# Patient Record
Sex: FEMALE | Race: WHITE | Employment: UNEMPLOYED | URBAN - NONMETROPOLITAN AREA
[De-identification: names, ages, dates, MRNs, and addresses within clinical notes are randomized per-mention and may not be internally consistent; named-entity substitution may affect disease eponyms.]

---

## 2021-09-06 ENCOUNTER — HOSPITAL ENCOUNTER (EMERGENCY)
Age: 26
Discharge: HOME | End: 2021-09-06
Attending: EMERGENCY MEDICINE
Payer: MEDICARE

## 2021-09-06 VITALS
HEIGHT: 61 IN | HEART RATE: 77 BPM | DIASTOLIC BLOOD PRESSURE: 67 MMHG | RESPIRATION RATE: 14 BRPM | OXYGEN SATURATION: 100 % | SYSTOLIC BLOOD PRESSURE: 118 MMHG | WEIGHT: 155 LBS | TEMPERATURE: 98.4 F | BODY MASS INDEX: 29.27 KG/M2

## 2021-09-06 VITALS — SYSTOLIC BLOOD PRESSURE: 111 MMHG | OXYGEN SATURATION: 98 % | HEART RATE: 70 BPM | DIASTOLIC BLOOD PRESSURE: 80 MMHG

## 2021-09-06 DIAGNOSIS — O03.4 INEVITABLE ABORTION: Primary | ICD-10-CM

## 2021-09-06 LAB
ABO/RH: NORMAL
ABSOLUTE EOS #: 0.04 K/UL (ref 0–0.44)
ABSOLUTE IMMATURE GRANULOCYTE: <0.03 K/UL (ref 0–0.3)
ABSOLUTE LYMPH #: 1.17 K/UL (ref 1.1–3.7)
ABSOLUTE MONO #: 0.35 K/UL (ref 0.1–1.2)
BASOPHILS # BLD: 1 % (ref 0–2)
BASOPHILS ABSOLUTE: <0.03 K/UL (ref 0–0.2)
DIFFERENTIAL TYPE: NORMAL
EOSINOPHILS RELATIVE PERCENT: 1 % (ref 1–4)
HCG QUANTITATIVE: 74 IU/L
HCT VFR BLD CALC: 40.3 % (ref 36.3–47.1)
HEMOGLOBIN: 13.2 G/DL (ref 11.9–15.1)
IMMATURE GRANULOCYTES: 0 %
LYMPHOCYTES # BLD: 31 % (ref 24–43)
MCH RBC QN AUTO: 29.7 PG (ref 25.2–33.5)
MCHC RBC AUTO-ENTMCNC: 32.8 G/DL (ref 25.2–33.5)
MCV RBC AUTO: 90.8 FL (ref 82.6–102.9)
MONOCYTES # BLD: 9 % (ref 3–12)
NRBC AUTOMATED: 0 PER 100 WBC
PDW BLD-RTO: 13.1 % (ref 11.8–14.4)
PLATELET # BLD: 270 K/UL (ref 138–453)
PLATELET ESTIMATE: NORMAL
PMV BLD AUTO: 9.6 FL (ref 8.1–13.5)
RBC # BLD: 4.44 M/UL (ref 3.95–5.11)
RBC # BLD: NORMAL 10*6/UL
SEG NEUTROPHILS: 58 % (ref 36–65)
SEGMENTED NEUTROPHILS ABSOLUTE COUNT: 2.14 K/UL (ref 1.5–8.1)
WBC # BLD: 3.7 K/UL (ref 3.5–11.3)
WBC # BLD: NORMAL 10*3/UL

## 2021-09-06 PROCEDURE — 36415 COLL VENOUS BLD VENIPUNCTURE: CPT

## 2021-09-06 PROCEDURE — 84702 CHORIONIC GONADOTROPIN TEST: CPT

## 2021-09-06 PROCEDURE — 99285 EMERGENCY DEPT VISIT HI MDM: CPT

## 2021-09-06 PROCEDURE — 86900 BLOOD TYPING SEROLOGIC ABO: CPT

## 2021-09-06 PROCEDURE — 86901 BLOOD TYPING SEROLOGIC RH(D): CPT

## 2021-09-06 PROCEDURE — 85025 COMPLETE CBC W/AUTO DIFF WBC: CPT

## 2021-09-06 NOTE — ED PROVIDER NOTES
[address]      Pt Name: Denise Love March  MRN: [id number]  [place] 1995  Date of evaluation: 9/6/2021      CHIEF COMPLAINT       Chief Complaint   Patient presents with    Vaginal Bleeding     about 6 weeks pregant          HISTORY OF PRESENT ILLNESS      The patient presents with vaginal bleeding. She says she has had vaginal bleeding throughout her entire pregnancy. Her quantitative hCG has been going up it was 289. Her estimated gestational age is 7 weeks. She says her doctor has not done an ultrasound yet because she has not yet in the discriminatory zone. She says her bleeding has gotten a little bit more heavy. It was just spotting. She is G5, P3 Ab1 having had 1 miscarriage. The patient denies fever. She has had a placenta previa but no other significant problems with her other pregnancies. She denies dizziness. She does not know her blood type. REVIEW OF SYSTEMS       All systems reviewed and negative unless noted in HPI. The patient denies fever or constitutional symptoms. Denies vision change. Denies any sore throat or rhinorrhea. Denies any neck pain or stiffness. Denies chest pain or shortness of breath. No nausea,  vomiting or diarrhea. Denies any dysuria. Denies urinary frequency or hematuria. Vaginal bleeding as noted in HPI. Denies musculoskeletal injury or pain. Denies any weakness, numbness or focal neurologic deficit. Denies any skin rash or edema. No recent psychiatric issues. No easy bruising or bleeding. Denies any polyuria, polydypsia or history of immunocompromise. PAST MEDICAL HISTORY    has no past medical history on file. SURGICAL HISTORY      has no past surgical history on file. CURRENT MEDICATIONS       Previous Medications    No medications on file       ALLERGIES     is allergic to tramadol. FAMILY HISTORY     has no family status information on file.       family history is not on file. SOCIAL HISTORY      reports that she has never smoked. She has never used smokeless tobacco.    PHYSICAL EXAM     INITIAL VITALS:  height is 5' 1\" (1.549 m) and weight is 155 lb (70.3 kg). Her tympanic temperature is 98.4 °F (36.9 °C). Her blood pressure is 118/67 and her pulse is 77. Her respiration is 14 and oxygen saturation is 100%. The patient is alert and oriented, in no apparent distress. HEENT is atraumatic. Pupils are PERRL at 4 mm. Mucous membranes moist.    Neck is supple. Heart sounds regular rate and rhythm with no gallops, murmurs, or rubs. Lungs clear, no wheezes, rales or rhonchi. Abdomen: soft, nontender with no pain to palpation. No pulsatile mass. Normal bowel sounds are noted. No rebound or guarding. Pelvic: Dark blood in vaginal vault. Cervical os closed. No adnexal tenderness or fullness. Musculoskeletal exam shows no evidence of trauma. Normal distal pulses in all extremities. Skin: no rash or edema. Neurological exam reveals cranial nerves 2 through 12 grossly intact. Patient has equal  and normal deep tendon reflexes. Psychiatric: Appropriate. Lymphatics.:  No lymphadenopathy.          DIFFERENTIAL DIAGNOSIS/ MDM:     Threatened AB, ectopic pregnancy    DIAGNOSTIC RESULTS       LABS:       CBC Auto Differential (Final result)   Component (Lab Inquiry)  Collection Time Result Time WBC RBC HGB HCT MCV   09/06/21 08:07:00 09/06/21 08:15:23 3.7 4.44 13.2 40.3 90.8       Collection Time Result Time MCH MCHC RDW PLT MPV   09/06/21 08:07:00 09/06/21 08:15:23 29.7 32.8 13.1 270 9.6       Collection Time Result Time NRBC Auto DIFF TYPE Seg Neutrophils LYMPHO Monocytes   09/06/21 08:07:00 09/06/21 08:15:23 0.0 NOT REPORTED 58 31 9       Collection Time Result Time EOS BASO Immature Granulocytes SEGS ABS Absolute Lymph #   09/06/21 08:07:00 09/06/21 08:15:23 1 1 0 2.14 1.17       Collection Time Result Time Absolute Mono # Absolute Eos # BASOS ABS absimmgran WBC Morphology   09/06/21 08:07:00 09/06/21 08:15:23 0.35 0.04 <0.03 <0.03 NOT REPORTED       Collection Time Result Time RBC Morphology Platelet Estimate   38/38/78 08:07:00 09/06/21 08:15:23 NOT REPORTED NOT REPORTED         Final result                          Contains abnormal data HCG, Quantitative, Pregnancy (Final result)   Component (Lab Inquiry)  Collection Time Result Time hCG Quant   09/06/21 08:07:00 09/06/21 08:58:54 74     Non-preg premeno . Elina Money Elina Money High        Final result                        ABO/RH (Final result)   Component (Lab Inquiry)  Collection Time Result Time ABO/Rh   09/06/21 08:07:00 09/06/21 08:28:37 A POSITIVE         Final result               EMERGENCY DEPARTMENT COURSE:   Vitals:    Vitals:    09/06/21 0755 09/06/21 0800   BP:  118/67   Pulse: 77    Resp: 14    Temp: 98.4 °F (36.9 °C)    TempSrc: Tympanic    SpO2: 100%    Weight: 155 lb (70.3 kg)    Height: 5' 1\" (1.549 m)      -------------------------  BP: 118/67, Temp: 98.4 °F (36.9 °C), Pulse: 77, Resp: 14      Re-evaluation Notes    The patient appears to be having a miscarriage. I explained that she will need to follow-up to make sure that it completes. She can expect increased bleeding. She should return for worsening bleeding, dizziness, fever, or if worse in any way. She knows to follow-up with her doctor. FINAL IMPRESSION      Inevitable AB    DISPOSITION/PLAN   DISPOSITION        Condition on Disposition    stable    PATIENT REFERRED TO:  No follow-up provider specified.     DISCHARGE MEDICATIONS:  New Prescriptions    No medications on file       (Please note that portions of this note were completed with a voice recognition program.  Efforts were made to edit the dictations but occasionally words are mis-transcribed.)    Vitor Tai MD,, MD   Attending Emergency Physician           Tarik Lira MD  09/06/21 [id number]

## 2022-10-10 ENCOUNTER — APPOINTMENT (OUTPATIENT)
Dept: ULTRASOUND IMAGING | Age: 27
End: 2022-10-10
Payer: MEDICARE

## 2022-10-10 ENCOUNTER — HOSPITAL ENCOUNTER (EMERGENCY)
Age: 27
Discharge: HOME | End: 2022-10-11
Attending: SPECIALIST
Payer: MEDICARE

## 2022-10-10 DIAGNOSIS — Z34.90 INTRAUTERINE PREGNANCY: ICD-10-CM

## 2022-10-10 DIAGNOSIS — R10.31 RIGHT LOWER QUADRANT PAIN: ICD-10-CM

## 2022-10-10 DIAGNOSIS — R10.31 RIGHT LOWER QUADRANT ABDOMINAL PAIN: Primary | ICD-10-CM

## 2022-10-10 LAB
ABSOLUTE EOS #: 0.16 K/UL (ref 0–0.44)
ABSOLUTE IMMATURE GRANULOCYTE: 0.04 K/UL (ref 0–0.3)
ABSOLUTE LYMPH #: 1.99 K/UL (ref 1.1–3.7)
ABSOLUTE MONO #: 0.5 K/UL (ref 0.1–1.2)
ALBUMIN SERPL-MCNC: 3.7 G/DL (ref 3.5–5.2)
ALBUMIN/GLOBULIN RATIO: 1.4 (ref 1–2.5)
ALP BLD-CCNC: 52 U/L (ref 35–104)
ALT SERPL-CCNC: 32 U/L (ref 5–33)
ANION GAP SERPL CALCULATED.3IONS-SCNC: 10 MMOL/L (ref 9–17)
AST SERPL-CCNC: 17 U/L
BACTERIA: ABNORMAL
BASOPHILS # BLD: 0 % (ref 0–2)
BASOPHILS ABSOLUTE: 0.03 K/UL (ref 0–0.2)
BILIRUB SERPL-MCNC: 0.1 MG/DL (ref 0.3–1.2)
BILIRUBIN URINE: NEGATIVE
BUN BLDV-MCNC: 8 MG/DL (ref 6–20)
BUN/CREAT BLD: ABNORMAL (ref 9–20)
CALCIUM SERPL-MCNC: 8.6 MG/DL (ref 8.6–10.4)
CHLORIDE BLD-SCNC: 104 MMOL/L (ref 98–107)
CO2: 24 MMOL/L (ref 20–31)
CREAT SERPL-MCNC: <0.4 MG/DL (ref 0.5–0.9)
EOSINOPHILS RELATIVE PERCENT: 2 % (ref 1–4)
EPITHELIAL CELLS UA: ABNORMAL /HPF (ref 0–5)
GFR SERPL CREATININE-BSD FRML MDRD: ABNORMAL ML/MIN/1.73M2
GLUCOSE BLD-MCNC: 89 MG/DL (ref 70–99)
GLUCOSE URINE: NEGATIVE
HCT VFR BLD CALC: 35.2 % (ref 36.3–47.1)
HEMOGLOBIN: 12.4 G/DL (ref 11.9–15.1)
IMMATURE GRANULOCYTES: 0 %
KETONES, URINE: NEGATIVE
LEUKOCYTE ESTERASE, URINE: NEGATIVE
LYMPHOCYTES # BLD: 21 % (ref 24–43)
MCH RBC QN AUTO: 31.2 PG (ref 25.2–33.5)
MCHC RBC AUTO-ENTMCNC: 35.2 G/DL (ref 25.2–33.5)
MCV RBC AUTO: 88.4 FL (ref 82.6–102.9)
MONOCYTES # BLD: 5 % (ref 3–12)
MUCUS: ABNORMAL
NITRITE, URINE: NEGATIVE
NRBC AUTOMATED: 0 PER 100 WBC
PDW BLD-RTO: 12.7 % (ref 11.8–14.4)
PH UA: 6 (ref 5–6)
PLATELET # BLD: 261 K/UL (ref 138–453)
PMV BLD AUTO: 9.6 FL (ref 8.1–13.5)
POTASSIUM SERPL-SCNC: 3.8 MMOL/L (ref 3.7–5.3)
PROTEIN UA: NEGATIVE
RBC # BLD: 3.98 M/UL (ref 3.95–5.11)
RBC UA: ABNORMAL /HPF (ref 0–4)
SEG NEUTROPHILS: 72 % (ref 36–65)
SEGMENTED NEUTROPHILS ABSOLUTE COUNT: 6.68 K/UL (ref 1.5–8.1)
SODIUM BLD-SCNC: 138 MMOL/L (ref 135–144)
SPECIFIC GRAVITY UA: 1.03 (ref 1.01–1.02)
TOTAL PROTEIN: 6.3 G/DL (ref 6.4–8.3)
URINE HGB: NEGATIVE
UROBILINOGEN, URINE: NORMAL
WBC # BLD: 9.4 K/UL (ref 3.5–11.3)
WBC UA: ABNORMAL /HPF (ref 0–4)

## 2022-10-10 PROCEDURE — 76700 US EXAM ABDOM COMPLETE: CPT

## 2022-10-10 PROCEDURE — 93975 VASCULAR STUDY: CPT

## 2022-10-10 PROCEDURE — 76805 OB US >/= 14 WKS SNGL FETUS: CPT

## 2022-10-10 PROCEDURE — 76705 ECHO EXAM OF ABDOMEN: CPT

## 2022-10-10 PROCEDURE — 99283 EMERGENCY DEPT VISIT LOW MDM: CPT

## 2022-10-10 PROCEDURE — 81001 URINALYSIS AUTO W/SCOPE: CPT

## 2022-10-10 PROCEDURE — 76856 US EXAM PELVIC COMPLETE: CPT

## 2022-10-10 PROCEDURE — 80053 COMPREHEN METABOLIC PANEL: CPT

## 2022-10-10 PROCEDURE — 85025 COMPLETE CBC W/AUTO DIFF WBC: CPT

## 2022-10-10 PROCEDURE — 36415 COLL VENOUS BLD VENIPUNCTURE: CPT

## 2022-10-10 RX ORDER — 0.9 % SODIUM CHLORIDE 0.9 %
500 INTRAVENOUS SOLUTION INTRAVENOUS ONCE
Status: DISCONTINUED | OUTPATIENT
Start: 2022-10-10 | End: 2022-10-11 | Stop reason: HOSPADM

## 2022-10-10 ASSESSMENT — PAIN SCALES - GENERAL: PAINLEVEL_OUTOF10: 5

## 2022-10-10 ASSESSMENT — PAIN - FUNCTIONAL ASSESSMENT: PAIN_FUNCTIONAL_ASSESSMENT: 0-10

## 2022-10-11 VITALS
RESPIRATION RATE: 16 BRPM | OXYGEN SATURATION: 99 % | DIASTOLIC BLOOD PRESSURE: 59 MMHG | TEMPERATURE: 99 F | BODY MASS INDEX: 33.38 KG/M2 | WEIGHT: 170 LBS | HEIGHT: 60 IN | HEART RATE: 78 BPM | SYSTOLIC BLOOD PRESSURE: 108 MMHG

## 2022-10-11 ASSESSMENT — ENCOUNTER SYMPTOMS
SHORTNESS OF BREATH: 0
ABDOMINAL PAIN: 1
NAUSEA: 0
SORE THROAT: 0
VOMITING: 0
COUGH: 0

## 2022-10-11 NOTE — DISCHARGE INSTRUCTIONS
PLEASE RETURN TO THE EMERGENCY DEPARTMENT IMMEDIATELY if your symptoms worsen in anyway or in 8-12 hours if not improved for re-evaluation. You should immediately return to the ER for symptoms such as increasing pain, bloody stool, fever, a feeling of passing out, light headed, dizziness, chest pain, shortness of breath, persistent nausea and/or vomiting, numbness or weakness to the arms or legs, coolness or color change of the arms or legs. Take your medication as indicated and prescribed. If you are given an antibiotic then, make sure you get the prescription filled and take the antibiotics until finished. Please understand that at this time there is no evidence for a more serious underlying process, but that early in the process of an illness or injury, an emergency department workup can be falsely reassuring. You should contact your family doctor within the next 24 hours for a follow up appointment    Jelena Oliveira!!!    From [facility] ([facility]) and [address] [place] [address] Emergency Services    On behalf of the Emergency Department staff at [facility]), I would like to thank you for giving us the opportunity to address your health care needs and concerns. We hope that during your visit, our service was delivered in a professional and caring manner. Please keep [facility]) in mind as we walk with you down the path to your own personal wellness. Please expect an automated text message or email from us so we can ask a few questions about your health and progress. Based on your answers, a clinician may call you back to offer help and instructions. Please understand that early in the process of an illness or injury, an emergency department workup can be falsely reassuring. If you notice any worsening, changing or persistent symptoms please call your family doctor or return to the ER immediately. Tell us how we did during your visit at [email or web address]   and let us know about your experience

## 2022-10-11 NOTE — ED PROVIDER NOTES
[address]      Pt Name: Celeste Estrella  MRN: [id number]  [place] 1995  Date of evaluation: 10/10/2022      CHIEF COMPLAINT       Chief Complaint   Patient presents with    Abdominal Pain     16 weeks pregnant, RLQ pain         HISTORY OF PRESENT ILLNESS    Celeste Estrella is a 28 y.o. female who presents to the emergency department accompanied by her family for evaluation of right lower quadrant abdominal pain since 2 days prior to arrival.  Patient is G6, P3 AB 2 about 16 weeks and 2 days pregnant and has been diagnosed with right ovarian cyst when she had pelvic ultrasound at 12 weeks gestation. Her last menstrual period was June 21, 2022 and patient denies any nausea, vomiting, constipation or diarrhea. She also denies any urinary frequency, urgency, dysuria or hematuria. No history of fever or chills. Appetite is normal and patient denies any vaginal bleeding, spotting or discharge. She has had 2 miscarriages in the past and is concerned about fetal viability and also concerned about ovarian cyst causing the pain. She has appointment with her obstetrician Dr. Kelli Cruz in [address] group tomorrow morning. There are no exacerbating or relieving factors and patient has not taken any medications for the pain prior to arrival.  Her appetite has been normal.  Her previous abdominal surgeries including Cholecystectomy. REVIEW OF SYSTEMS       Review of Systems   Constitutional:  Negative for chills, diaphoresis and fever. HENT:  Negative for congestion and sore throat. Respiratory:  Negative for cough and shortness of breath. Cardiovascular:  Negative for chest pain and palpitations. Gastrointestinal:  Positive for abdominal pain. Negative for nausea and vomiting. Genitourinary:  Negative for dysuria, frequency, vaginal bleeding and vaginal discharge. Neurological:  Negative for dizziness and light-headedness.    All other systems reviewed and are negative. PAST MEDICAL HISTORY    has no past medical history on file. SURGICAL HISTORY      has no past surgical history on file. CURRENT MEDICATIONS     There are no discharge medications for this patient. ALLERGIES     is allergic to tramadol. FAMILY HISTORY     has no family status information on file. family history is not on file. SOCIAL HISTORY      reports that she has been smoking cigarettes. She has been smoking an average of .5 packs per day. She has never used smokeless tobacco.    PHYSICAL EXAM     INITIAL VITALS:  height is 5' (1.524 m) and weight is 170 lb (77.1 kg). Her tympanic temperature is 99 °F (37.2 °C). Her blood pressure is 107/65 and her pulse is 87. Her respiration is 17 and oxygen saturation is 99%. Physical Exam  Vitals and nursing note reviewed. Constitutional:       General: She is not in acute distress. Appearance: She is well-developed. HENT:      Head: Normocephalic and atraumatic. Nose: Nose normal.   Eyes:      Extraocular Movements: Extraocular movements intact. Pupils: Pupils are equal, round, and reactive to light. Cardiovascular:      Rate and Rhythm: Normal rate and regular rhythm. Heart sounds: Normal heart sounds. No murmur heard. Pulmonary:      Effort: Pulmonary effort is normal. No respiratory distress. Breath sounds: Normal breath sounds. Abdominal:      General: Bowel sounds are normal. There is no distension or abdominal bruit. Palpations: Abdomen is soft. There is no pulsatile mass. Tenderness: There is abdominal tenderness in the right lower quadrant. There is no right CVA tenderness, left CVA tenderness, guarding or rebound. Negative signs include Silva's sign and McBurney's sign. Musculoskeletal:      Cervical back: Normal range of motion and neck supple. Skin:     General: Skin is warm and dry. Neurological:      General: No focal deficit present.       Mental Status: She is alert and oriented to person, place, and time. Psychiatric:         Behavior: Behavior normal.         Thought Content: Thought content normal.        DIFFERENTIAL DIAGNOSIS/ MDM:     Cholecystitis, appendicitis, pancreatitis,  urinary tract infection, renal stone, small bowel obstruction,diverticulitis, gastritis, enteritis, colitis. DIAGNOSTIC RESULTS     EKG: All EKG's are interpreted by the Emergency Department Physician who either signs or Co-signs this chart in the absence of a cardiologist.    None obtained      RADIOLOGY:   Interpretation per the Radiologist below, if available at the time of this note:    US ABDOMEN COMPLETE    Result Date: 10/11/2022  EXAMINATION: COMPLETE ABDOMINAL ULTRASOUND 10/10/2022 10:14 pm COMPARISON: None. HISTORY: ORDERING SYSTEM PROVIDED HISTORY: r/o appendicitis TECHNOLOGIST PROVIDED HISTORY: r/o appendicitis Reason for Exam: r/o appendicitis - r/o appendicitis FINDINGS: Ultrasound performed to evaluate for appendicitis. No acute appendicitis is I dense cracked that the appendix is not seen, therefore unable to exclude underlying acute appendicitis. Ultrasound technologist notes that the right ovary is located in the patient's area of pain. 1. The appendix is not seen, and appendicitis is unable to be excluded. 2. Ultrasound technologist notes at the right ovary is in the region of patient's pain. The right ovary will be described on the dedicated pelvis report done the same day. US PELVIS COMPLETE    Result Date: 10/11/2022  EXAMINATION: PELVIC ULTRASOUND 10/10/2022 TECHNIQUE: Transabdominal pelvic ultrasound duplex ultrasound using B-mode/gray scaled imaging, Doppler spectral analysis and color flow Doppler was obtained.  COMPARISON: None HISTORY: ORDERING SYSTEM PROVIDED HISTORY: Pain, Rule out Torsion and fetal viability TECHNOLOGIST PROVIDED HISTORY: Pain, Rule out Torsion and fetal viability Reason for Exam: Pain, Rule out Torsion and fetal viability FINDINGS: Measurements: Uterus: 15.4 x 11.0 x 9.3 cm Gestational sac: There is a single intrauterine gestational sac. No yolk sac is identified. The fetal pole measures 10.58 cm. Cardiac activity calculated at 100 60 beats per minute. Estimated fetal age of 12 weeks 3 days based on current ultrasound parameters, with a estimated delivery date of 03/24/2023. Right Ovary:4.0 x 2.8 x 2.9 cm. Left Ovary: 2.5 x 2.4 x 1.4 cm. Ultrasound Findings: Uterus: Uterus demonstrates normal myometrial echotexture. Endometrial stripe: Endometrial stripe is within normal limits. Right Ovary: Suspected luteal cyst with mild peripheral rim thickening, centrally hypoechoic, with mild peripheral vascularity, seen in the right ovary measuring 2.0 cm. There is normal arterial and venous Doppler flow. Left Ovary:  Left ovary is within normal limits. There is normal arterial and venous Doppler flow. Free Fluid: No evidence of free fluid. 1. Live intrauterine pregnancy, with a mean gestational age of 12 weeks 3 days based on current ultrasound parameters. Estimated dated delivery of 03/24/2023 based on ultrasound. 2. Arterial and venous flow documented to both ovaries. 3. Corpus luteal cyst noted within the right ovary measuring 2.0 cm.               ED BEDSIDE ULTRASOUND:       LABS:  Labs Reviewed   CBC WITH AUTO DIFFERENTIAL - Abnormal; Notable for the following components:       Result Value    Hematocrit 35.2 (*)     MCHC 35.2 (*)     Seg Neutrophils 72 (*)     Lymphocytes 21 (*)     All other components within normal limits   COMPREHENSIVE METABOLIC PANEL - Abnormal; Notable for the following components:    Creatinine <0.40 (*)     Total Bilirubin 0.1 (*)     Total Protein 6.3 (*)     All other components within normal limits   URINALYSIS WITH REFLEX TO CULTURE - Abnormal; Notable for the following components:    Specific Gravity, UA 1.030 (*)     All other components within normal limits   MICROSCOPIC URINALYSIS - Abnormal; Notable for the following components:    Bacteria, UA TRACE (*)     Mucus, UA 1+ (*)     All other components within normal limits       I reviewed all the lab results and these are unremarkable. No evidence of leukocytosis or UTI    EMERGENCY DEPARTMENT COURSE:   Vitals:    Vitals:    10/10/22 2012   BP: 107/65   Pulse: 87   Resp: 17   Temp: 99 °F (37.2 °C)   TempSrc: Tympanic   SpO2: 99%   Weight: 170 lb (77.1 kg)   Height: 5' (1.524 m)     -------------------------  BP: 107/65, Temp: 99 °F (37.2 °C), Heart Rate: 87, Resp: 17    Orders Placed This Encounter   Medications    0.9 % sodium chloride bolus       During the emergency department course, patient was given normal saline bolus followed by infusion. Patient did not require any medications for the pain or the nausea. Her pelvic ultrasound is unremarkable with no evidence of ovarian torsion or ovarian cyst rupture. Appendix was unable to be visualized on abdominal ultrasound. Patient was offered CAT scan of the abdomen and pelvis to rule out any possibility of appendicitis although I do not have a high index of suspicion. Patient prefers not to get a CAT scan at this time and will follow up with her obstetrician in the morning. She does not have any nausea, vomiting, fever or chills. There is no evidence of leukocytosis and her appetite is normal.  There is no rebound tenderness and there is no guarding or rigidity. Patient was ultimately discharged home with instructions drink plenty of oral fluids, take Tylenol as needed for the pain, follow-up with her obstetrician tomorrow morning, return if abdominal pain recurs and worsens or if she develops any new symptoms. She is advised to call us if any questions, concerns or problems. I have reviewed the disposition diagnosis with the patient and or their family/guardian. I have answered their questions and given discharge instructions.  They voiced understanding of these instructions and did not have any further questions or complaints. Re-evaluation Notes    Patient is resting comfortably and does not appear to be in any pain or distress prior to discharge    CRITICAL CARE:   None        CONSULTS:      PROCEDURES:  None    FINAL IMPRESSION      1. Right lower quadrant abdominal pain    2. Intrauterine pregnancy          DISPOSITION/PLAN   DISPOSITION Decision To Discharge    Condition on Disposition    Stable    PATIENT REFERRED TO:  Evelyn Luciano MD  [address] [place] [address], [address]  [phone]    Go today  For reevaluation of current symptoms    [address] [facility] ED  [facility].  [phone]    If symptoms worsen      DISCHARGE MEDICATIONS:  There are no discharge medications for this patient. (Please note that portions of this note were completed with a voice recognition program.  Efforts were made to edit the dictations but occasionally words are mis-transcribed.)    Zo Bradford MD,, MD, F.A.C.E.P.   Attending Emergency Physician                          Zo Bradford MD  10/11/22 5738